# Patient Record
Sex: MALE | HISPANIC OR LATINO | Employment: UNEMPLOYED | URBAN - METROPOLITAN AREA
[De-identification: names, ages, dates, MRNs, and addresses within clinical notes are randomized per-mention and may not be internally consistent; named-entity substitution may affect disease eponyms.]

---

## 2023-06-26 ENCOUNTER — HOSPITAL ENCOUNTER (EMERGENCY)
Facility: HOSPITAL | Age: 58
Discharge: HOME | End: 2023-06-26
Attending: EMERGENCY MEDICINE
Payer: COMMERCIAL

## 2023-06-26 VITALS
WEIGHT: 202.16 LBS | DIASTOLIC BLOOD PRESSURE: 86 MMHG | HEART RATE: 98 BPM | SYSTOLIC BLOOD PRESSURE: 168 MMHG | OXYGEN SATURATION: 95 % | RESPIRATION RATE: 20 BRPM | TEMPERATURE: 97.6 F

## 2023-06-26 DIAGNOSIS — K04.7 DENTAL INFECTION: Primary | ICD-10-CM

## 2023-06-26 DIAGNOSIS — S10.0XXA: ICD-10-CM

## 2023-06-26 LAB
ALBUMIN SERPL BCP-MCNC: 3.9 G/DL (ref 3.5–5)
ALP SERPL-CCNC: 62 U/L (ref 34–104)
ALT SERPL W P-5'-P-CCNC: 31 U/L (ref 7–52)
ANION GAP SERPL CALCULATED.3IONS-SCNC: 12 MMOL/L
APTT PPP: 26 SECONDS (ref 23–37)
AST SERPL W P-5'-P-CCNC: 14 U/L (ref 13–39)
BASOPHILS # BLD AUTO: 0.08 THOUSANDS/ÂΜL (ref 0–0.1)
BASOPHILS NFR BLD AUTO: 1 % (ref 0–1)
BILIRUB SERPL-MCNC: 0.32 MG/DL (ref 0.2–1)
BUN SERPL-MCNC: 28 MG/DL (ref 5–25)
CALCIUM SERPL-MCNC: 9.5 MG/DL (ref 8.4–10.2)
CHLORIDE SERPL-SCNC: 107 MMOL/L (ref 96–108)
CO2 SERPL-SCNC: 21 MMOL/L (ref 21–32)
CREAT SERPL-MCNC: 1.05 MG/DL (ref 0.6–1.3)
EOSINOPHIL # BLD AUTO: 0.13 THOUSAND/ÂΜL (ref 0–0.61)
EOSINOPHIL NFR BLD AUTO: 1 % (ref 0–6)
ERYTHROCYTE [DISTWIDTH] IN BLOOD BY AUTOMATED COUNT: 14.6 % (ref 11.6–15.1)
GFR SERPL CREATININE-BSD FRML MDRD: 78 ML/MIN/1.73SQ M
GLUCOSE SERPL-MCNC: 221 MG/DL (ref 65–140)
HCT VFR BLD AUTO: 40.3 % (ref 36.5–49.3)
HGB BLD-MCNC: 13 G/DL (ref 12–17)
IMM GRANULOCYTES # BLD AUTO: 0.17 THOUSAND/UL (ref 0–0.2)
IMM GRANULOCYTES NFR BLD AUTO: 1 % (ref 0–2)
INR PPP: 0.88 (ref 0.84–1.19)
LYMPHOCYTES # BLD AUTO: 2.98 THOUSANDS/ÂΜL (ref 0.6–4.47)
LYMPHOCYTES NFR BLD AUTO: 20 % (ref 14–44)
MCH RBC QN AUTO: 28.3 PG (ref 26.8–34.3)
MCHC RBC AUTO-ENTMCNC: 32.3 G/DL (ref 31.4–37.4)
MCV RBC AUTO: 88 FL (ref 82–98)
MONOCYTES # BLD AUTO: 0.81 THOUSAND/ÂΜL (ref 0.17–1.22)
MONOCYTES NFR BLD AUTO: 6 % (ref 4–12)
NEUTROPHILS # BLD AUTO: 10.52 THOUSANDS/ÂΜL (ref 1.85–7.62)
NEUTS SEG NFR BLD AUTO: 71 % (ref 43–75)
NRBC BLD AUTO-RTO: 0 /100 WBCS
PLATELET # BLD AUTO: 257 THOUSANDS/UL (ref 149–390)
PMV BLD AUTO: 10.1 FL (ref 8.9–12.7)
POTASSIUM SERPL-SCNC: 3.7 MMOL/L (ref 3.5–5.3)
PROT SERPL-MCNC: 6.5 G/DL (ref 6.4–8.4)
PROTHROMBIN TIME: 12.2 SECONDS (ref 11.6–14.5)
RBC # BLD AUTO: 4.59 MILLION/UL (ref 3.88–5.62)
SODIUM SERPL-SCNC: 140 MMOL/L (ref 135–147)
WBC # BLD AUTO: 14.69 THOUSAND/UL (ref 4.31–10.16)

## 2023-06-26 PROCEDURE — 36415 COLL VENOUS BLD VENIPUNCTURE: CPT | Performed by: PHYSICIAN ASSISTANT

## 2023-06-26 PROCEDURE — 80053 COMPREHEN METABOLIC PANEL: CPT | Performed by: PHYSICIAN ASSISTANT

## 2023-06-26 PROCEDURE — 85610 PROTHROMBIN TIME: CPT | Performed by: PHYSICIAN ASSISTANT

## 2023-06-26 PROCEDURE — 85730 THROMBOPLASTIN TIME PARTIAL: CPT | Performed by: PHYSICIAN ASSISTANT

## 2023-06-26 PROCEDURE — 85025 COMPLETE CBC W/AUTO DIFF WBC: CPT | Performed by: PHYSICIAN ASSISTANT

## 2023-06-26 RX ORDER — AMOXICILLIN 500 MG/1
500 CAPSULE ORAL EVERY 12 HOURS SCHEDULED
Qty: 20 CAPSULE | Refills: 0 | Status: SHIPPED | OUTPATIENT
Start: 2023-06-26 | End: 2023-07-06

## 2023-06-26 RX ORDER — NAPROXEN 500 MG/1
500 TABLET ORAL 2 TIMES DAILY WITH MEALS
Qty: 20 TABLET | Refills: 0 | Status: SHIPPED | OUTPATIENT
Start: 2023-06-26 | End: 2024-06-25

## 2023-06-26 NOTE — ED PROVIDER NOTES
History  Chief Complaint   Patient presents with   • Dental Problem     States pain/bleeding right upper rear gums/teeth     59-year-old male reports past medical history significant for diabetes hypertension and psoriasis who presents today for evaluation of dental pain on the right-sided upper jaw as well as bleeding from the mouth  Patient reports he was eating [employer] fries and reports he noted some bleeding reports this was atypical for him  Patient reports he has had multiple teeth extracted secondary to diabetes and infections  Patient reports other than the [employer] fries he did not eat anything sharp or have any trauma to the posterior pharynx  Patient reports he has been coughing and has some green phlegm however otherwise no other associated symptoms  Patient denies a history of bleeding problems  Patient is not a smoker  None       Past Medical History:   Diagnosis Date   • Diabetes mellitus ([address] )    • Hypertension    • Psoriasis        Past Surgical History:   Procedure Laterality Date   • APPENDECTOMY     • EYE SURGERY     • JOINT REPLACEMENT      left hip   • LIPOMA RESECTION         History reviewed  No pertinent family history  I have reviewed and agree with the history as documented  E-Cigarette/Vaping     E-Cigarette/Vaping Substances     Social History     Tobacco Use   • Smoking status: Never   • Smokeless tobacco: Never   Substance Use Topics   • Alcohol use: Not Currently   • Drug use: Never       Review of Systems   Constitutional: Negative for chills, fatigue and fever  HENT: Positive for dental problem  Negative for congestion, ear pain, rhinorrhea and sore throat  Eyes: Negative for redness  Respiratory: Negative for chest tightness and shortness of breath  Cardiovascular: Negative for chest pain and palpitations  Gastrointestinal: Negative for abdominal pain, nausea and vomiting  Genitourinary: Negative for dysuria and hematuria  Musculoskeletal: Negative  Skin: Negative for rash  Neurological: Negative for dizziness, syncope, light-headedness and numbness  Physical Exam  Physical Exam  Vitals and nursing note reviewed  Constitutional:       Appearance: Normal appearance  He is well-developed  HENT:      Head: Normocephalic and atraumatic  Mouth/Throat:        Comments: Patient is managing oral secretions without difficulty  No swelling noted sublingually  Eyes:      General: No scleral icterus  Pupils: Pupils are equal, round, and reactive to light  Cardiovascular:      Rate and Rhythm: Normal rate and regular rhythm  Pulses: Normal pulses  Pulmonary:      Effort: Pulmonary effort is normal  No respiratory distress  Breath sounds: No stridor  Abdominal:      General: There is no distension  Palpations: There is no mass  Musculoskeletal:      Cervical back: Normal range of motion  Skin:     General: Skin is warm and dry  Capillary Refill: Capillary refill takes less than 2 seconds  Coloration: Skin is not jaundiced  Neurological:      Mental Status: He is alert and oriented to person, place, and time        Gait: Gait normal    Psychiatric:         Mood and Affect: Mood normal               Media Information          Vital Signs  ED Triage Vitals [06/26/23 0815]   Temperature Pulse Respirations Blood Pressure SpO2   97 6 °F (36 4 °C) (!) 109 20 168/86 95 %      Temp Source Heart Rate Source Patient Position - Orthostatic VS BP Location FiO2 (%)   Tympanic Monitor Sitting Left arm --      Pain Score       --           Vitals:    06/26/23 0815 06/26/23 0909   BP: 168/86    Pulse: (!) 109 98   Patient Position - Orthostatic VS: Sitting          Visual Acuity      ED Medications  Medications - No data to display    Diagnostic Studies  Results Reviewed     Procedure Component Value Units Date/Time    Comprehensive metabolic panel [[id number]]  (Abnormal) Collected: 06/26/23 0853    Lab Status: Final result Specimen: Blood from Arm, Right Updated: 06/26/23 0917     Sodium 140 mmol/L      Potassium 3 7 mmol/L      Chloride 107 mmol/L      CO2 21 mmol/L      ANION GAP 12 mmol/L      BUN 28 mg/dL      Creatinine 1 05 mg/dL      Glucose 221 mg/dL      Calcium 9 5 mg/dL      AST 14 U/L      ALT 31 U/L      Alkaline Phosphatase 62 U/L      Total Protein 6 5 g/dL      Albumin 3 9 g/dL      Total Bilirubin 0 32 mg/dL      eGFR 78 ml/min/1 73sq m     Narrative:      [employer] Kid[employer] guidelines for Chronic Kidney Disease (CKD):   •  Stage 1 with normal or high GFR (GFR > 90 mL/min/1 73 square meters)  •  Stage 2 Mild CKD (GFR = 60-89 mL/min/1 73 square meters)  •  Stage 3A Moderate CKD (GFR = 45-59 mL/min/1 73 square meters)  •  Stage 3B Moderate CKD (GFR = 30-44 mL/min/1 73 square meters)  •  Stage 4 Severe CKD (GFR = 15-29 mL/min/1 73 square meters)  •  Stage 5 End Stage CKD (GFR <15 mL/min/1 73 square meters)  Note: GFR calculation is accurate only with a steady state creatinine    Protime-INR [[id number]]  (Normal) Collected: 06/26/23 0853    Lab Status: Final result Specimen: Blood from Arm, Right Updated: 06/26/23 0911     Protime 12 2 seconds      INR 0 88    APTT [[id number]]  (Normal) Collected: 06/26/23 0853    Lab Status: Final result Specimen: Blood from Arm, Right Updated: 06/26/23 0911     PTT 26 seconds     CBC and differential [[id number]]  (Abnormal) Collected: 06/26/23 0853    Lab Status: Final result Specimen: Blood from Arm, Right Updated: 06/26/23 0901     WBC 14 69 Thousand/uL      RBC 4 59 Million/uL      Hemoglobin 13 0 g/dL      Hematocrit 40 3 %      MCV 88 fL      MCH 28 3 pg      MCHC 32 3 g/dL      RDW 14 6 %      MPV 10 1 fL      Platelets 685 Thousands/uL      nRBC 0 /100 WBCs      Neutrophils Relative 71 %      Immat GRANS % 1 %      Lymphocytes Relative 20 %      Monocytes Relative 6 %      Eosinophils Relative 1 %      Basophils Relative 1 %      Neutrophils Absolute 10 52 Thousands/µL      Immature Grans Absolute 0 17 Thousand/uL      Lymphocytes Absolute 2 98 Thousands/µL      Monocytes Absolute 0 81 Thousand/µL      Eosinophils Absolute 0 13 Thousand/µL      Basophils Absolute 0 08 Thousands/µL                  No orders to display              Procedures  Procedures         ED Course  ED Course as of 06/26/23 0929   Mon Jun 26, 2023   0903 WBC(!): 14 69  Patient has a known right-sided upper jaw dental infection this is an expected finding not outside of expected parameters  1408 Patient was reexamined at this time and informed of laboratory and/or imaging results and was found to be stable for discharge  Return to emergency department criteria was reviewed with the patient who verbalized understanding and was agreeable to discharge and the treatment plan at this time  Medical Decision Making  Patient is a 77-year-old male presents today for evaluation of right-sided upper jaw dental pain as well as bleeding that he noted when eating [employer] fries  Patient denies fevers or chills or other associated symptoms and denies any bleeding disorders, rashes to his body and notes he is not a smoker  Physical exam is consistent with a localized dental infection to the right upper jaw however patient does notably have hematoma present in the posterior pharynx no active bleeding is noted and no petechiae however blood work obtained to evaluate for potential thrombocytopenia or severe anemia or other significant life-threatening conditions to cause the patient's posterior pharyngeal bleeding  Patient is managing oral secretions without difficulty there is no sublingual swelling and no facial swelling or erythema to suggest a deep space infection such as Skip's angina or facial cellulitis no indication for imaging at this time      Work-up reveals a leukocytosis which is consistent with the patient's diagnosed right-sided upper jaw dental "infection for which amoxicillin and naproxen were prescribed and the patient was advised to follow-up with dentist/dental surgeon  Other blood work is without significant abnormalities specifically thrombocytopenia or clotting cascade disorders  All imaging and/or lab testing discussed with patient, strict return to ED precautions discussed  Patient and/or family members verbalizes understanding and agrees with plan  Patient is stable for discharge     Portions of the record may have been created with voice recognition software  Occasional wrong word or \"sound a like\" substitutions may have occurred due to the inherent limitations of voice recognition software  Read the chart carefully and recognize, using context, where substitutions have occurred  Amount and/or Complexity of Data Reviewed  Labs: ordered  Decision-making details documented in ED Course  Disposition  Final diagnoses:   Dental infection   Hematoma of pharynx     Time reflects when diagnosis was documented in both MDM as applicable and the Disposition within this note     Time User Action Codes Description Comment    6/26/2023  9:27 AM Santos Kelley [K04 7] Dental infection     6/26/2023  9:27 AM Helene Lundberg Anita [S10  0XXA] Hematoma of pharynx       ED Disposition     ED Disposition   Discharge    Condition   Good    Date/Time   Mon Jun 26, 2023  9:27 AM    Comment   Abbi Isabel discharge to home/self care                 Follow-up Information     Follow up With Specialties Details Why [address]  Schedule an appointment as soon as possible for a visit in 2 days  [address]          Patient's Medications   Discharge Prescriptions    AMOXICILLIN (AMOXIL) 500 MG CAPSULE    Take 1 capsule (500 mg total) by mouth every 12 (twelve) hours for 10 days       Start Date: 6/26/2023 End Date: 7/6/2023       Order Dose: 500 mg       " Quantity: 20 capsule    Refills: 0    NAPROXEN (EC NAPROSYN) 500 MG EC TABLET    Take 1 tablet (500 mg total) by mouth 2 (two) times a day with meals       Start Date: 6/26/2023 End Date: 6/25/2024       Order Dose: 500 mg       Quantity: 20 tablet    Refills: 0       No discharge procedures on file      PDMP Review     None          ED Provider  Electronically Signed by           Adriana Langley PA-C  06/26/23 6774

## 2023-06-26 NOTE — Clinical Note
Zina Wong was seen and treated in our emergency department on 6/26/2023  Diagnosis:     Sana Whitmore  may return to work on return date  He may return on this date: 06/27/2023         If you have any questions or concerns, please don't hesitate to call        Roni Evangelista PA-C    ______________________________           _______________          _______________  Hospital Representative                              Date                                Time